# Patient Record
Sex: MALE | Race: WHITE | NOT HISPANIC OR LATINO | URBAN - METROPOLITAN AREA
[De-identification: names, ages, dates, MRNs, and addresses within clinical notes are randomized per-mention and may not be internally consistent; named-entity substitution may affect disease eponyms.]

---

## 2019-05-26 ENCOUNTER — OFFICE VISIT (OUTPATIENT)
Dept: URGENT CARE | Facility: CLINIC | Age: 39
End: 2019-05-26
Payer: COMMERCIAL

## 2019-05-26 VITALS
SYSTOLIC BLOOD PRESSURE: 110 MMHG | TEMPERATURE: 97 F | DIASTOLIC BLOOD PRESSURE: 78 MMHG | HEART RATE: 80 BPM | HEIGHT: 69 IN | OXYGEN SATURATION: 97 % | WEIGHT: 135.13 LBS | BODY MASS INDEX: 20.01 KG/M2

## 2019-05-26 DIAGNOSIS — T78.40XA ALLERGIC REACTION, INITIAL ENCOUNTER: Primary | ICD-10-CM

## 2019-05-26 PROCEDURE — 99213 PR OFFICE/OUTPT VISIT, EST, LEVL III, 20-29 MIN: ICD-10-PCS | Mod: 25,S$GLB,, | Performed by: NURSE PRACTITIONER

## 2019-05-26 PROCEDURE — 99999 PR PBB SHADOW E&M-NEW PATIENT-LVL III: CPT | Mod: PBBFAC,,, | Performed by: NURSE PRACTITIONER

## 2019-05-26 PROCEDURE — 99213 OFFICE O/P EST LOW 20 MIN: CPT | Mod: 25,S$GLB,, | Performed by: NURSE PRACTITIONER

## 2019-05-26 PROCEDURE — 3008F PR BODY MASS INDEX (BMI) DOCUMENTED: ICD-10-PCS | Mod: CPTII,S$GLB,, | Performed by: NURSE PRACTITIONER

## 2019-05-26 PROCEDURE — 99999 PR PBB SHADOW E&M-NEW PATIENT-LVL III: ICD-10-PCS | Mod: PBBFAC,,, | Performed by: NURSE PRACTITIONER

## 2019-05-26 PROCEDURE — 96372 THER/PROPH/DIAG INJ SC/IM: CPT | Mod: S$GLB,,, | Performed by: NURSE PRACTITIONER

## 2019-05-26 PROCEDURE — 96372 PR INJECTION,THERAP/PROPH/DIAG2ST, IM OR SUBCUT: ICD-10-PCS | Mod: S$GLB,,, | Performed by: NURSE PRACTITIONER

## 2019-05-26 PROCEDURE — 3008F BODY MASS INDEX DOCD: CPT | Mod: CPTII,S$GLB,, | Performed by: NURSE PRACTITIONER

## 2019-05-26 RX ORDER — NAPROXEN 500 MG/1
TABLET ORAL
COMMUNITY

## 2019-05-26 RX ORDER — PREDNISONE 10 MG/1
TABLET ORAL
COMMUNITY

## 2019-05-26 RX ORDER — METHYLPREDNISOLONE 4 MG/1
TABLET ORAL
Qty: 1 PACKAGE | Refills: 0 | Status: SHIPPED | OUTPATIENT
Start: 2019-05-26

## 2019-05-26 RX ORDER — METHYLPREDNISOLONE 4 MG/1
TABLET ORAL
Qty: 1 PACKAGE | Refills: 0 | Status: SHIPPED | OUTPATIENT
Start: 2019-05-26 | End: 2019-05-26 | Stop reason: SDUPTHER

## 2019-05-26 RX ORDER — METHYLPREDNISOLONE ACETATE 40 MG/ML
40 INJECTION, SUSPENSION INTRA-ARTICULAR; INTRALESIONAL; INTRAMUSCULAR; SOFT TISSUE ONCE
Status: COMPLETED | OUTPATIENT
Start: 2019-05-26 | End: 2019-05-26

## 2019-05-26 RX ORDER — MONTELUKAST SODIUM 10 MG/1
TABLET ORAL
COMMUNITY

## 2019-05-26 RX ORDER — HYDROCODONE POLISTIREX AND CHLORPHENIRAMINE POLISTIREX 10; 8 MG/5ML; MG/5ML
SUSPENSION, EXTENDED RELEASE ORAL
COMMUNITY

## 2019-05-26 RX ADMIN — METHYLPREDNISOLONE ACETATE 40 MG: 40 INJECTION, SUSPENSION INTRA-ARTICULAR; INTRALESIONAL; INTRAMUSCULAR; SOFT TISSUE at 09:05

## 2019-05-26 NOTE — PROGRESS NOTES
"Subjective:       Patient ID: Gilberto Hernandez is a 38 y.o. male.    Chief Complaint: Allergic Reaction and Rash    Patient is presenting with c/o " rash all over body after starting OTC meds to increase his energy level. No SOB, fever, N.V. OTC meds have decreased the syptoms.     Review of Systems   Constitutional: Negative.    Respiratory: Negative.    Genitourinary: Negative.    Skin: Positive for color change and rash.       Objective:      /78 (BP Location: Right arm, Patient Position: Sitting, BP Method: Medium (Manual))   Pulse 80   Temp 96.5 °F (35.8 °C) (Tympanic)   Ht 5' 9" (1.753 m)   Wt 61.3 kg (135 lb 2.3 oz)   SpO2 97%   BMI 19.96 kg/m²   Physical Exam   Constitutional: He appears well-developed and well-nourished. No distress.   Cardiovascular: Normal rate, regular rhythm, normal heart sounds and intact distal pulses.   Pulmonary/Chest: Effort normal and breath sounds normal. No stridor. No respiratory distress. He has no wheezes. He has no rales.   Skin: Capillary refill takes less than 2 seconds. Rash noted. He is not diaphoretic. There is erythema.   Raised, erythema rash, bilateral arms, legs, and back.    Psychiatric: He has a normal mood and affect.   Nursing note reviewed.      Assessment:       1. Allergic reaction, initial encounter        Plan:       Allergic reaction, initial encounter    Other orders  -     methylPREDNISolone (MEDROL DOSEPACK) 4 mg tablet; use as directed  Dispense: 1 Package; Refill: 0  -     methylPREDNISolone acetate injection 40 mg          Heather Trant PA-C [facility]  "

## 2019-05-26 NOTE — PATIENT INSTRUCTIONS
Local Allergic Reaction, Other  You are having an allergic reaction. Almost anything can cause one. Different people are allergic to different things. It is usually something that you ate or swallowed, came into contact with by getting or putting it on your skin or clothes, or something you breathed in the air. This can be very annoying and sometimes scary.  Symptoms of an allergic reaction can include:  · Rash, hives, redness, welts, blisters  · Itching, burning, stinging, pain  · Dry, flaky, cracking, scaly skin  · Swelling of the face, lips or other parts of the body  Sometimes the cause of an allergic reaction may be obvious. To help identify your allergen, remember:  · When it started  · What you were doing at the time or just before that  · Any activities you were involved in  · Any new products or contacts  Here are some common causes, but remember almost anything can cause a reaction, and you may not even be aware that you came into contact with one of these things.  · Dust, mold, pollen  · Plants such as poison ivy and poison oak are common ones, but there are many others  · Animals  · Foods such as shrimp, shellfish, peanuts, milk products, gluten, eggs; also colorings, flavorings, additives  · Insect bites or stings such as bees, mosquitos, flees, ticks  · Medicines such as penicillin, sulfa drugs, amoxicillin, aspirin, ibuprofen; any medicine can cause a reaction  · Jewelry such as nickel, gold  (new, or something youve worn for a while including zippers, and  buttons)  · Latex such as in gloves, clothes, toys, balloons, or some tapes (some people allergic to latex may also have problems with foods like bananas, avocados, kiwi, papaya, or chestnuts)  · Lotions, perfumes, cosmetics, soaps, shampoos, skincare products, nail products  · Chemicals or dyes in clothing, linen, , hair dyes, soaps, iodine  Home care    The goal of our treatment is to help relieve the symptoms, and get you feeling  better. The rash will usually fade over several days, but can sometimes last a couple of weeks. Over the next couple of days, there may be times when it is gets a little worse, and then better again. Here are some things to do:  · If you know what you are allergic to, avoid it because future reactions could be worse than this one.  · Avoid tight clothing and anything that heats up your skin (hot showers/baths, direct sunlight) since heat will make itching worse.  · An ice pack will relieve local areas of intense itching and redness. Dont put the ice directly on the skin, because it can damage the skin. You can also ice put it in a plastic bag. Wrap it in something like a towel, jean pierre shirt, or cloth.  · Oral Benadryl (diphenhydramine) is an antihistamine available at drug and grocery stores. Unless a prescription antihistamine was given, Benadryl may be used to reduce itching if large areas of the skin are involved. It may make you sleepy, so be careful using it in the daytime or when going to school, working, or driving. [NOTE: Do not use Benadryl if you have glaucoma or if you are a man with trouble urinating due to an enlarged prostate.] There are antihistamines that causes less drowsiness and is a good alternatives for daytime use. Ask your pharmacist for suggestions.  · Do not use Benadryl cream on your skin, because in some people it can cause a further reaction, and make you allergic to Benadryl.  · Try not to scratch. This can tear the skin and cause an infection.  · Using heat-steam to clean your home, using high-efficiency particulate (HEPA) vacuums and filters, avoiding food and pet triggers, exterminating cockroaches, and frequent house cleaning are a few of the strategies used to decrease allergic reactions.  Follow-up care  Follow up with your healthcare provider, or as advised if your symptoms do not continue to improve or get worse.  Call 911  Call 911 if any of these occur:  · Trouble breathing or  swallowing, wheezing  · New or worsening swelling in the mouth, throat, or tongue  · Hoarse voice or trouble speaking  · Confused   · Very drowsy or trouble awakening  · Fainting or loss of consciousness  · Rapid heart rate  · Low blood pressure  · Feeling of doom  · Nausea, vomiting, abdominal pain, diarrhea  · Vomiting blood, or large amounts of blood in stool  · Seizure  When to seek medical advice  Call your healthcare provider right away if any of the following occur:  · Spreading areas of itching, redness or swelling  · New or worse swelling in the face, eyelids, or  lips  · Dizziness, weakness  · Signs of infection:  ¨ Spreading redness  ¨ Increased pain or swelling  ¨ Fever of 100.4ºF (38ºC) or higher, or as directed by your healthcare provider  ¨ Colored fluid draining from the inflamed areas  Date Last Reviewed: 7/30/2015  © 1309-5419 [employer]. [address] [place] [id number]. All rights reserved. This information is not intended as a substitute for professional medical care. Always follow your healthcare professional's instructions.

## 2019-05-26 NOTE — PROGRESS NOTES
Pt ordered Depo 40 mg by provider. Educated pt on medication and procedure. Reviewed allergies and medication.  Advised pt to remain in building for 15 mins after medication in case of an allergic reaction. Pt verbalized understanding.  Administered medication. Pt tolerated well.